# Patient Record
Sex: MALE | Race: WHITE
[De-identification: names, ages, dates, MRNs, and addresses within clinical notes are randomized per-mention and may not be internally consistent; named-entity substitution may affect disease eponyms.]

---

## 2020-11-28 ENCOUNTER — HOSPITAL ENCOUNTER (EMERGENCY)
Dept: HOSPITAL 62 - ER | Age: 25
LOS: 1 days | Discharge: LEFT BEFORE DISCHARGE | End: 2020-11-29
Payer: SELF-PAY

## 2020-11-28 VITALS — DIASTOLIC BLOOD PRESSURE: 102 MMHG | SYSTOLIC BLOOD PRESSURE: 126 MMHG

## 2020-11-28 DIAGNOSIS — Z88.0: ICD-10-CM

## 2020-11-28 DIAGNOSIS — K21.9: ICD-10-CM

## 2020-11-28 DIAGNOSIS — F10.10: Primary | ICD-10-CM

## 2020-11-28 DIAGNOSIS — F95.2: ICD-10-CM

## 2020-11-28 PROCEDURE — 99281 EMR DPT VST MAYX REQ PHY/QHP: CPT

## 2020-11-28 NOTE — ER DOCUMENT REPORT
ED Medical Screen (RME)





- General


Stated Complaint: ALCOHOL ABUSE, PYSCH PROBLEM


Time Seen by Provider: 11/28/20 22:47


Notes: 





Patient is a 25-year-old male presents emergency department wanting help with 

his alcohol abuse.  Patient's last drink was on the way here to the emergency 

department.  Patient's friend is at bedside and states that normally he drinks a

gallon to a gallon half of alcohol a day.  Friend states that "he will drink any

type of alcohol he can get his hands on." Patient has history of Tourette's 

syndrome.  Patient states that alcohol is the only thing that helps control his 

Tourette's.





Exam: Patient appears intoxicated.





I have greeted and performed a rapid initial assessment of this patient.  A 

comprehensive ED assessment and evaluation of the patient, analysis of test 

results and completion of medical decision making process will be conducted by 

an additional ED providers.


TRAVEL OUTSIDE OF THE [place] IN LAST 30 DAYS: No





- Related Data


Allergies/Adverse Reactions: 


                                        





amoxicillin trihydrate [From Amoxil] Allergy (Verified 01/02/16 13:24)


   


Penicillins Allergy (Verified 01/02/16 13:24)


   


bee stings Allergy (Uncoded 10/23/15 11:21)


   











Past Medical History


GI Medical History: Reports: Hx Gastroesophageal Reflux Disease


Musculoskeltal Medical History: Reports Hx Musculoskeletal Trauma


Psychiatric Medical History: Reports: Hx Anxiety, Hx Depression


Past Surgical History: Reports: Hx Orthopedic Surgery - right shoulder X2





- Immunizations


Hx Diphtheria, Pertussis, Tetanus Vaccination: Yes





Physical Exam





- Vital signs


Vitals: 





                                        











Temp Pulse Resp BP Pulse Ox


 


 98.4 F   107 H  14   126/102 H  97 


 


 11/28/20 22:58  11/28/20 22:58  11/28/20 22:58  11/28/20 22:58  11/28/20 22:58














Course





- Vital Signs


Vital signs: 





                                        











Temp Pulse Resp BP Pulse Ox


 


 98.4 F   107 H  14   126/102 H  97 


 


 11/28/20 22:58  11/28/20 22:58  11/28/20 22:58  11/28/20 22:58  11/28/20 22:58